# Patient Record
Sex: MALE | Race: WHITE | NOT HISPANIC OR LATINO | Employment: STUDENT | URBAN - METROPOLITAN AREA
[De-identification: names, ages, dates, MRNs, and addresses within clinical notes are randomized per-mention and may not be internally consistent; named-entity substitution may affect disease eponyms.]

---

## 2022-12-01 ENCOUNTER — OFFICE VISIT (OUTPATIENT)
Dept: OTOLARYNGOLOGY | Facility: CLINIC | Age: 22
End: 2022-12-01

## 2022-12-01 VITALS — WEIGHT: 185 LBS | TEMPERATURE: 97.3 F | BODY MASS INDEX: 27.4 KG/M2 | HEIGHT: 69 IN

## 2022-12-01 DIAGNOSIS — J32.4 CHRONIC PANSINUSITIS: Primary | ICD-10-CM

## 2022-12-01 DIAGNOSIS — H83.8X9 SUPERIOR SEMICIRCULAR CANAL DEHISCENCE, UNSPECIFIED LATERALITY: ICD-10-CM

## 2022-12-01 DIAGNOSIS — R42 VERTIGO: ICD-10-CM

## 2022-12-01 RX ORDER — MULTIVIT-MIN/IRON FUM/FOLIC AC 7.5 MG-4
TABLET ORAL DAILY
COMMUNITY

## 2022-12-01 RX ORDER — MOMETASONE FUROATE 50 UG/1
100 SPRAY, METERED NASAL DAILY
COMMUNITY
Start: 2022-11-23

## 2022-12-01 RX ORDER — LEVOCETIRIZINE DIHYDROCHLORIDE 5 MG/1
1 TABLET, FILM COATED ORAL
COMMUNITY
Start: 2022-11-23

## 2022-12-01 RX ORDER — IBUPROFEN 200 MG
200 TABLET ORAL
COMMUNITY

## 2022-12-01 NOTE — PROGRESS NOTES
Assessment/Plan:  Chronic sinusitis, with continued nasal d/c  Begin using Sinus Rinse daily  Continue Flonase  New symptoms suggestive of semicircular canal dehiscence; CT ordered of T-bones and sinuses  F/u after CT  Diagnosis ICD-10-CM Associated Orders   1  Chronic pansinusitis  J32 4       2  Vertigo  R42              Subjective:      Patient ID: Reta Arellano is a 25 y o  male  Pt with history of chronic sinusitis, s/p septo/[facility]ESS at [facility] in fall 2391, complicated by post-op hemorrhage and return to the OR for arterial ligation  His sinusitis improved, but over the past few months has relapsed with recurrent sinusitis, clogged ears, nasal d/c  Also, in October, he had three episodes of brief vertigo after blowing his nose  He has tinnitus in his left ear, and frequent popping in his ears  The following portions of the patient's history were reviewed and updated as appropriate: allergies, current medications, past family history, past medical history, past social history, past surgical history and problem list     Review of Systems      Objective:      Temp (!) 97 3 °F (36 3 °C) (Temporal)   Ht 5' 9" (1 753 m)   Wt 83 9 kg (185 lb)   BMI 27 32 kg/m²          Physical Exam  Constitutional:       Appearance: He is well-developed and well-nourished  HENT:      Head: Normocephalic and atraumatic  Right Ear: Tympanic membrane, ear canal and external ear normal  No drainage  No middle ear effusion  Left Ear: Tympanic membrane, ear canal and external ear normal  No drainage  No middle ear effusion  Nose: Nose normal       Mouth/Throat:      Mouth: Oropharynx is clear and moist and mucous membranes are normal       Pharynx: Uvula midline  No oropharyngeal exudate  Tonsils: 0 on the right  0 on the left  Neck:      Thyroid: No thyroid mass or thyromegaly  Trachea: Trachea normal  No tracheal deviation  Lymphadenopathy:      Cervical: No cervical adenopathy  Neurological:      Mental Status: He is alert  Flexible Fiberoptic Nasal Endoscopy Procedure Note:  Indication:  Chronic sinusitis  Verbal consent obtained  Surgeon: Hernandez Parker MD  Anesthesia: 4% lidocaine, oxymetazoline  Scope passed through nasal cavities bilaterally    Nasopharynx: normal  Right Nasal Cavity:   Mucosa: s/p ESS   Secretions: thick, greenish  Left Nasal Cavity:   Mucosa: s/p ESS   Secretions: thick, greenish  Other findings = none  Patient tolerated procedure well without complications